# Patient Record
Sex: MALE | Race: WHITE | NOT HISPANIC OR LATINO | Employment: UNEMPLOYED | URBAN - NONMETROPOLITAN AREA
[De-identification: names, ages, dates, MRNs, and addresses within clinical notes are randomized per-mention and may not be internally consistent; named-entity substitution may affect disease eponyms.]

---

## 2022-06-14 ENCOUNTER — OFFICE VISIT (OUTPATIENT)
Dept: SURGERY | Facility: CLINIC | Age: 27
End: 2022-06-14

## 2022-06-14 VITALS
SYSTOLIC BLOOD PRESSURE: 120 MMHG | BODY MASS INDEX: 28.67 KG/M2 | WEIGHT: 189.2 LBS | TEMPERATURE: 98.6 F | DIASTOLIC BLOOD PRESSURE: 76 MMHG | HEART RATE: 74 BPM | HEIGHT: 68 IN

## 2022-06-14 DIAGNOSIS — N63.0 MASS OF BREAST, UNSPECIFIED LATERALITY: Primary | ICD-10-CM

## 2022-06-14 PROCEDURE — 99204 OFFICE O/P NEW MOD 45 MIN: CPT | Performed by: SURGERY

## 2022-06-14 NOTE — PROGRESS NOTES
Subjective   James Jiménez is a 27 y.o. male     Chief Complaint: Bilateral masses just under the nipple    History of Present Illness referred after presenting with bilateral masses under both nipples.  [facility] done at [facility].  Reviewed the study of just the left breast and mammogram was unremarkable and ultrasound demonstrates a very superficial benign cystic-appearing lesion just under the skin with 3 to 4 mm in size.  Patient is a  and he takes testosterone.  No other steroids.  No nipple discharge.  Left side is more tender than right side but he can feel these little masses under both nipples.  Patient wishes to have these removed if possible and again he states that the left side is more tender than the right side.  Radiology recommended repeat ultrasound in 6 months and felt that the what they saw on the left breast was benign my review of the study I agree lesion is just very superficial and just under the skin more consistent with a sebaceous cyst to be anything within the breast tissue significantly.  Patient is aware of the testosterone may have effect on his breast and he understands that    Review of Systems   Constitutional: Negative.    HENT: Negative.    Eyes: Negative.    Respiratory: Negative.    Cardiovascular: Negative.    Gastrointestinal: Negative.    Endocrine: Negative.    Genitourinary: Negative.    Musculoskeletal: Negative.    Skin: Negative.    Allergic/Immunologic: Negative.    Neurological: Negative.    Hematological: Negative.    Psychiatric/Behavioral: Negative.      Past Medical History:   Diagnosis Date   • Anxiety      Past Surgical History:   Procedure Laterality Date   • WISDOM TOOTH EXTRACTION       Family History   Problem Relation Age of Onset   • Cancer Maternal Grandmother    • Colon cancer Maternal Grandmother      Social History     Socioeconomic History   • Marital status:     Tobacco Use   • Smoking status: Former Smoker     Quit date:      Years since quittin.4   • Smokeless tobacco: Former User     Quit date:    Substance and Sexual Activity   • Alcohol use: Never   • Drug use: Yes     Types: Marijuana     Comment: Currently Uses Marijuana     No Known Allergies  Vitals:    22 0828   BP: 120/76   Pulse: 74   Temp: 98.6 °F (37 °C)       Home Medications:  Prior to Admission medications    Not on File       Objective   Physical Exam  Constitutional:       General: He is not in acute distress.     Appearance: He is well-developed.   HENT:      Head: Normocephalic and atraumatic.   Eyes:      General: No scleral icterus.     Conjunctiva/sclera: Conjunctivae normal.   Neck:      Thyroid: No thyromegaly.      Trachea: No tracheal deviation.   Cardiovascular:      Rate and Rhythm: Normal rate and regular rhythm.      Heart sounds: Normal heart sounds.     No friction rub.   Pulmonary:      Effort: Pulmonary effort is normal. No respiratory distress.      Breath sounds: Normal breath sounds. No stridor.   Chest:      Chest wall: No tenderness.       Abdominal:      General: There is no distension.      Palpations: Abdomen is soft.      Tenderness: There is no abdominal tenderness.   Musculoskeletal:         General: No deformity. Normal range of motion.      Cervical back: Normal range of motion and neck supple.   Lymphadenopathy:      Cervical: No cervical adenopathy.   Skin:     General: Skin is warm and dry.      Coloration: Skin is not pale.      Findings: No erythema or rash.      Nails: There is no clubbing.   Neurological:      Mental Status: He is alert and oriented to person, place, and time.   Psychiatric:         Behavior: Behavior normal.         Thought Content: Thought content normal.         Assessment & Plan Likely benign subcutaneous nodules at both nipples.  We talked about testosterone and it may or may not be involved and patient clearly understands that  and understands the reason that he takes testosterone for bodybuilding.  He wishes to have these lesions removed I would recommend getting ultrasound of the right breast as well to evaluate the area under the right nipple.  We will actually get both breast evaluated by ultrasound here in our facility have the patient return to see us in 2 weeks for further recommendations.  I do not think a mammogram needs to be done.      The encounter diagnosis was Mass of breast, unspecified laterality.                     This document has been electronically signed by Rufus Marquez MD on June 14, 2022 10:04 CDT

## 2022-06-29 ENCOUNTER — OFFICE VISIT (OUTPATIENT)
Dept: SURGERY | Facility: CLINIC | Age: 27
End: 2022-06-29

## 2022-06-29 VITALS
SYSTOLIC BLOOD PRESSURE: 128 MMHG | BODY MASS INDEX: 29.01 KG/M2 | WEIGHT: 191.4 LBS | TEMPERATURE: 98.5 F | DIASTOLIC BLOOD PRESSURE: 88 MMHG | HEART RATE: 63 BPM | HEIGHT: 68 IN

## 2022-06-29 DIAGNOSIS — N63.0 MASS OF BREAST, UNSPECIFIED LATERALITY: Primary | ICD-10-CM

## 2022-06-29 PROCEDURE — 99213 OFFICE O/P EST LOW 20 MIN: CPT | Performed by: SURGERY

## 2022-06-29 NOTE — PROGRESS NOTES
27-year-old male returns after bilateral breast ultrasounds for concerns about masses under both nipples.  Patient's study at [facility] which is not available today as he did not bring the disc was read as being a 3 to 4 mm cystic lesion just under the skin 12 o'clock position of left breast.  Radiologist thought this was benign and we thought it was benign as well when we evaluated him before.  He was concerned that he had masses under both nipples so we obtained a bilateral mammogram here of both nipples and on our studies and I reviewed the studies myself demonstrate a 4 x 1 mm cystic very superficial mass just under the skin lesion at 12 o'clock position of the right breast nipple area.  Patient remains convinced that he has palpable masses on both sides and he is a  and is also concerned about how his nipples look and he feels that they protrude out farther than they are supposed to.  He does take testosterone for bodybuilding reasons.    I went over the ultrasound finding here with him and reviewed the study and answered all of his questions.    On exam he has thickened area underneath the nipple areolar complex 9 o'clock position that is only felt when you pinch the nipple.  This not felt on palpation from the outside otherwise.  He has a similar but less type of sensation with the right breast underneath the nipple areolar complex.  Again this is only felt with pinching the breast and is not felt on superficial palpation otherwise.  Feels more like thickened tissue at both sites.  We talked about excising the 1 in the left breast the 1 on the right breast is even less obvious and I would not recommend anything be done there.  He is actually concerned about how the nipples look so I think this is more of a plastic surgery situation than any type of mass or malignant process either with the breast or the skin.  I fully discussed this with him.  [facility] or another general surgeon for second opinion at this point as I do not think that I would be addressing his concerns appropriately.  There is some risk to the nipple areolar complex but do any type of surgery here and we discussed that as well.  Encouraged him to take a disc of his recent studies with him today and take that along with the disc from [place] if he goes to be evaluated by anyone.      He can return to clinic care as needed otherwise